# Patient Record
Sex: MALE | Race: OTHER | HISPANIC OR LATINO | URBAN - METROPOLITAN AREA
[De-identification: names, ages, dates, MRNs, and addresses within clinical notes are randomized per-mention and may not be internally consistent; named-entity substitution may affect disease eponyms.]

---

## 2021-06-14 ENCOUNTER — EMERGENCY (EMERGENCY)
Facility: HOSPITAL | Age: 51
LOS: 1 days | Discharge: LEFT BEFORE DISCHARGE | End: 2021-06-14
Attending: STUDENT IN AN ORGANIZED HEALTH CARE EDUCATION/TRAINING PROGRAM
Payer: SELF-PAY

## 2021-06-14 VITALS
DIASTOLIC BLOOD PRESSURE: 80 MMHG | OXYGEN SATURATION: 95 % | WEIGHT: 119.93 LBS | RESPIRATION RATE: 18 BRPM | SYSTOLIC BLOOD PRESSURE: 128 MMHG | HEART RATE: 96 BPM | HEIGHT: 66 IN | TEMPERATURE: 98 F

## 2021-06-14 LAB
ALBUMIN SERPL ELPH-MCNC: 4.3 G/DL — SIGNIFICANT CHANGE UP (ref 3.3–5.2)
ALP SERPL-CCNC: 92 U/L — SIGNIFICANT CHANGE UP (ref 40–120)
ALT FLD-CCNC: 15 U/L — SIGNIFICANT CHANGE UP
ANION GAP SERPL CALC-SCNC: 17 MMOL/L — SIGNIFICANT CHANGE UP (ref 5–17)
AST SERPL-CCNC: 22 U/L — SIGNIFICANT CHANGE UP
BASOPHILS # BLD AUTO: 0.04 K/UL — SIGNIFICANT CHANGE UP (ref 0–0.2)
BASOPHILS NFR BLD AUTO: 0.6 % — SIGNIFICANT CHANGE UP (ref 0–2)
BILIRUB SERPL-MCNC: <0.2 MG/DL — LOW (ref 0.4–2)
BUN SERPL-MCNC: 11.6 MG/DL — SIGNIFICANT CHANGE UP (ref 8–20)
CALCIUM SERPL-MCNC: 8.6 MG/DL — SIGNIFICANT CHANGE UP (ref 8.6–10.2)
CHLORIDE SERPL-SCNC: 107 MMOL/L — SIGNIFICANT CHANGE UP (ref 98–107)
CO2 SERPL-SCNC: 22 MMOL/L — SIGNIFICANT CHANGE UP (ref 22–29)
CREAT SERPL-MCNC: 0.76 MG/DL — SIGNIFICANT CHANGE UP (ref 0.5–1.3)
EOSINOPHIL # BLD AUTO: 0.27 K/UL — SIGNIFICANT CHANGE UP (ref 0–0.5)
EOSINOPHIL NFR BLD AUTO: 3.8 % — SIGNIFICANT CHANGE UP (ref 0–6)
ETHANOL SERPL-MCNC: 336 MG/DL — HIGH (ref 0–9)
GLUCOSE SERPL-MCNC: 112 MG/DL — HIGH (ref 70–99)
HCT VFR BLD CALC: 44.5 % — SIGNIFICANT CHANGE UP (ref 39–50)
HGB BLD-MCNC: 14.9 G/DL — SIGNIFICANT CHANGE UP (ref 13–17)
IMM GRANULOCYTES NFR BLD AUTO: 0.4 % — SIGNIFICANT CHANGE UP (ref 0–1.5)
LYMPHOCYTES # BLD AUTO: 3.02 K/UL — SIGNIFICANT CHANGE UP (ref 1–3.3)
LYMPHOCYTES # BLD AUTO: 42 % — SIGNIFICANT CHANGE UP (ref 13–44)
MCHC RBC-ENTMCNC: 29.9 PG — SIGNIFICANT CHANGE UP (ref 27–34)
MCHC RBC-ENTMCNC: 33.5 GM/DL — SIGNIFICANT CHANGE UP (ref 32–36)
MCV RBC AUTO: 89.4 FL — SIGNIFICANT CHANGE UP (ref 80–100)
MONOCYTES # BLD AUTO: 0.62 K/UL — SIGNIFICANT CHANGE UP (ref 0–0.9)
MONOCYTES NFR BLD AUTO: 8.6 % — SIGNIFICANT CHANGE UP (ref 2–14)
NEUTROPHILS # BLD AUTO: 3.21 K/UL — SIGNIFICANT CHANGE UP (ref 1.8–7.4)
NEUTROPHILS NFR BLD AUTO: 44.6 % — SIGNIFICANT CHANGE UP (ref 43–77)
PLATELET # BLD AUTO: 398 K/UL — SIGNIFICANT CHANGE UP (ref 150–400)
POTASSIUM SERPL-MCNC: 3.5 MMOL/L — SIGNIFICANT CHANGE UP (ref 3.5–5.3)
POTASSIUM SERPL-SCNC: 3.5 MMOL/L — SIGNIFICANT CHANGE UP (ref 3.5–5.3)
PROT SERPL-MCNC: 7.6 G/DL — SIGNIFICANT CHANGE UP (ref 6.6–8.7)
RBC # BLD: 4.98 M/UL — SIGNIFICANT CHANGE UP (ref 4.2–5.8)
RBC # FLD: 12.8 % — SIGNIFICANT CHANGE UP (ref 10.3–14.5)
SODIUM SERPL-SCNC: 146 MMOL/L — HIGH (ref 135–145)
WBC # BLD: 7.19 K/UL — SIGNIFICANT CHANGE UP (ref 3.8–10.5)
WBC # FLD AUTO: 7.19 K/UL — SIGNIFICANT CHANGE UP (ref 3.8–10.5)

## 2021-06-14 PROCEDURE — 99284 EMERGENCY DEPT VISIT MOD MDM: CPT

## 2021-06-14 NOTE — ED PROVIDER NOTE - UNABLE TO OBTAIN
(+)unable to provide secondary to acute intoxication Severe Illness/Injury (+)unable to obtain secondary to acute intoxication

## 2021-06-14 NOTE — ED PROVIDER NOTE - NSFOLLOWUPINSTRUCTIONS_ED_ALL_ED_FT
Intoxicación de alcohol    LO QUE NECESITA SABER:    ¿Qué es la intoxicación con alcohol?La intoxicación por alcohol es aliyah condición física dañina causada cuando usted ephraim más alcohol de lo que calderon cuerpo puede manejar. También se llama envenenamiento por etanol o estado ebrio.    ¿Qué necesito saber acerca de los límites recomendados de alcohol?  •Los hombres de 21 a 64 añosdeberían limitar el consumo de alcohol a 2 tragos al día. No tome más de 4 bebidas por día o más de 14 en aliyah semana.      •Todos los hombres y las mujeres de 65 años o másdeberían limitar el consumo de alcohol a 1 trago por día. No tome más de 3 bebidas por día o más de 7 en aliyah semana. Ninguna cantidad de alcohol se considera adecuada yolanda el embarazo.      ¿Qué son los signos y síntomas comunes de la intoxicación con alcohol?  •Aliento que huele rip alcohol      •Pérdida de la conciencia o convulsiones      •Pupilas dilatadas o movimientos oculares más rápidos de lo que normalmente son para usted      •Latidos cardíacos rápidos y respiración lenta      •Pérdida del equilibrio o incapacidad de caminar derecho o permanecer parado      •Náuseas y vómitos      •Habla incoherente o fuertemente      •Cambios de humor rápidos      •Problemas en el trabajo o en la escuela, o comportamiento riesgoso, rip tener relaciones sexuales sin protección o conducir en estado de embriaguez      ¿Cómo se trata la intoxicación de alcohol?Calderon médico le preguntará por calderon consumo de alcohol. Las preguntas pueden incluir la cantidad, la frecuencia y el tipo de alcohol que usted ephraim. Calderon médico puede evaluar calderon memoria. Se pueden examinar muestras de tahira u orina para detectar alcohol y signos de daño hepático, renal o cardíaco. Es posible que deba seguir alguno de los siguientes tratamientos:   •Los medicamentospueden administrarse para ayudarlo a mantener la calma, controlar las convulsiones o prevenir las náuseas y los vómitos. También se le pueden administrar glucosa o vitamina B1 si nicolle niveles son demasiado bajos.      •En la terapia de intervención breve,un profesional de la davina lo ayuda a pensar de manera diferente sobre calderon consumo de alcohol. Imani profesional también lo ayuda a fijar metas para disminuir calderon consumo de bebidas alcohólicas. La terapia puede continuar después de que sale del hospital.      •Puede administrarse oxígeno adicionalsi está tan intoxicado que no puede respirar alexander por sí mismo.      ¿Dónde puedo obtener más información?  •Alcoholics Anonymous  Web Address: http://www.aa.org      •Substance Abuse and Mental Health Services Administration  [address]  [place] [address]  Web Address: http://www.samhsa.gov        Llame al número de emergencias local (911 en los [place]) si:  •Tiene dolor en el pecho o dificultad repentina para respirar.      •Usted sufre aliyah convulsión.      •Usted se siente suficientemente hazel rip para lastimarse o lastimar a otras personas.      ¿Cuándo jeannie llamar a mi médico?  •Usted tiene alucinaciones (ve o escucha cosas que no son reales).      •Usted no puede dejar de vomitar.      •Usted tiene preguntas o inquietudes acerca de calderon condición o cuidado.      ACUERDOS SOBRE CALDERON CUIDADO:    Usted tiene el derecho de ayudar a planear calderon cuidado. Aprenda todo lo que pueda sobre calderon condición y rip darle tratamiento. Discuta nicolle opciones de tratamiento con nicolle médicos para decidir el cuidado que usted desea recibir. Usted siempre tiene el derecho de rechazar el tratamiento.

## 2021-06-14 NOTE — ED PROVIDER NOTE - NEUROLOGICAL, MLM
Alert and oriented, no focal deficits, no motor or sensory deficits. , no focal deficits, no motor or sensory deficits. no motor deficits.

## 2021-06-14 NOTE — ED PROVIDER NOTE - OBJECTIVE STATEMENT
51y male pt with pmhx of presents to ED c/o ot found outside of [facility] with several other people who are intoxicated brought in for further evaluation. Pt altered unable to provide hx. 51y male pt with no pmhx presents to ED c/o acute intoxication. Pt found outside of [facility] with several other people who are intoxicated brought in for further evaluation. Pt altered unable to provide hx.

## 2021-06-14 NOTE — ED PROVIDER NOTE - CONSTITUTIONAL, MLM
(+)slurred speech, disheveled, well appearing, awake, oriented to person, place, time/situation and in no apparent distress. normal... (+)slurred speech, disheveled, well appearing, awake, oriented to person, and in no apparent distress.

## 2021-06-14 NOTE — ED ADULT TRIAGE NOTE - CHIEF COMPLAINT QUOTE
BIBEM[place] front of supermarket. +Slurred speech. Denies alcohol or drug use. Pt placed in yellow gown. Belongings secured. Pt in NAD.

## 2021-06-15 VITALS
RESPIRATION RATE: 18 BRPM | HEART RATE: 75 BPM | DIASTOLIC BLOOD PRESSURE: 88 MMHG | SYSTOLIC BLOOD PRESSURE: 151 MMHG | TEMPERATURE: 99 F | OXYGEN SATURATION: 97 %

## 2021-06-15 PROCEDURE — 80053 COMPREHEN METABOLIC PANEL: CPT

## 2021-06-15 PROCEDURE — 80307 DRUG TEST PRSMV CHEM ANLYZR: CPT

## 2021-06-15 PROCEDURE — 36415 COLL VENOUS BLD VENIPUNCTURE: CPT

## 2021-06-15 PROCEDURE — 85025 COMPLETE CBC W/AUTO DIFF WBC: CPT

## 2021-06-15 PROCEDURE — G0378: CPT

## 2021-06-15 PROCEDURE — 82962 GLUCOSE BLOOD TEST: CPT

## 2021-06-15 PROCEDURE — 99285 EMERGENCY DEPT VISIT HI MDM: CPT

## 2021-06-15 PROCEDURE — 99236 HOSP IP/OBS SAME DATE HI 85: CPT

## 2021-06-15 NOTE — ED ADULT NURSE REASSESSMENT NOTE - NS ED NURSE REASSESS COMMENT FT1
pt left before name change. Pt resp are even and unlabored, skin color randall for race. Pt left before discharge and name change.  made aware. Charge RN made aware. Pt a&ox4. Pt ambulating with steady gait prior to leaving. Pt resp are even and unlabored, skin color randall for race.

## 2021-06-15 NOTE — ED ADULT NURSE NOTE - NSIMPLEMENTINTERV_GEN_ALL_ED
Implemented All Fall Risk Interventions:  [place] to call system. Call bell, personal items and telephone within reach. Instruct patient to call for assistance. Room bathroom lighting operational. Non-slip footwear when patient is off stretcher. Physically safe environment: no spills, clutter or unnecessary equipment. Stretcher in lowest position, wheels locked, appropriate side rails in place. Provide visual cue, wrist band, yellow gown, etc. Monitor gait and stability. Monitor for mental status changes and reorient to person, place, and time. Review medications for side effects contributing to fall risk. Reinforce activity limits and safety measures with patient and family.

## 2021-06-15 NOTE — ED CDU PROVIDER INITIAL DAY NOTE - OBJECTIVE STATEMENT
51y male pt with no pmhx presents to ED c/o acute intoxication. Pt found outside of [facility] with several other people who are intoxicated brought in for further evaluation. Pt altered unable to provide hx.

## 2021-06-15 NOTE — ED CDU PROVIDER INITIAL DAY NOTE - UNABLE TO OBTAIN
(+)unable to provide secondary to acute intoxication (+)unable to obtain secondary to acute intoxication Severe Illness/Injury

## 2021-06-15 NOTE — ED ADULT NURSE NOTE - OBJECTIVE STATEMENT
Pt is alert and confused. Pt reoriented to the ER. Pt states that he had 4 beer today. Pt speech is slurred. Pt denies sob, chest pain, nausea, vomiting and dizziness. Pt resp are even and unlabored, skin color randall for race. pt educated on plan of care, pt able to successfully teach back plan of care to RN, RN will continue to reeducate pt during hospital stay.

## 2021-06-22 NOTE — ED CDU PROVIDER DISPOSITION NOTE - PATIENT PORTAL LINK FT
You can access the FollowMyHealth Patient Portal offered by [facility] by registering at the following website: [email or web address]. By joining [email or web address]’s FollowMyHealth portal, you will also be able to view your health information using other applications (apps) compatible with our system.

## 2021-06-22 NOTE — ED CDU PROVIDER DISPOSITION NOTE - CLINICAL COURSE
Patient presented in acute intoxication without other significant findings. Patient's mental state has improved. He has been ambulatory, awake, alert, and oriented. Patient however, walked out of the department prior to updating his name in the system.